# Patient Record
Sex: FEMALE | Race: WHITE | Employment: FULL TIME | URBAN - METROPOLITAN AREA
[De-identification: names, ages, dates, MRNs, and addresses within clinical notes are randomized per-mention and may not be internally consistent; named-entity substitution may affect disease eponyms.]

---

## 2017-01-18 ENCOUNTER — TELEPHONE (OUTPATIENT)
Dept: ENT CLINIC | Age: 27
End: 2017-01-18

## 2017-01-18 DIAGNOSIS — J01.91 ACUTE RECURRENT SINUSITIS, UNSPECIFIED LOCATION: Primary | ICD-10-CM

## 2017-01-18 RX ORDER — AZITHROMYCIN 250 MG/1
TABLET, FILM COATED ORAL
Qty: 1 PACKET | Refills: 0 | Status: SHIPPED | OUTPATIENT
Start: 2017-01-18 | End: 2017-03-13

## 2020-03-09 ENCOUNTER — HOSPITAL ENCOUNTER (EMERGENCY)
Age: 30
Discharge: HOME | End: 2020-03-09
Payer: COMMERCIAL

## 2020-03-09 VITALS
DIASTOLIC BLOOD PRESSURE: 90 MMHG | WEIGHT: 122 LBS | BODY MASS INDEX: 25.61 KG/M2 | TEMPERATURE: 97.9 F | HEART RATE: 78 BPM | RESPIRATION RATE: 17 BRPM | SYSTOLIC BLOOD PRESSURE: 136 MMHG | OXYGEN SATURATION: 97 % | HEIGHT: 58 IN

## 2020-03-09 LAB
A/G RATIO: 1.5 (ref 1.1–2.2)
ALBUMIN SERPL-MCNC: 4.9 G/DL (ref 3.4–5)
ALP BLD-CCNC: 68 U/L (ref 40–129)
ALT SERPL-CCNC: 13 U/L (ref 10–40)
ANION GAP SERPL CALCULATED.3IONS-SCNC: 13 MMOL/L (ref 3–16)
AST SERPL-CCNC: 18 U/L (ref 15–37)
BASOPHILS ABSOLUTE: 0 K/UL (ref 0–0.2)
BASOPHILS RELATIVE PERCENT: 0.5 %
BILIRUB SERPL-MCNC: 0.5 MG/DL (ref 0–1)
BILIRUBIN URINE: NEGATIVE
BLOOD, URINE: NEGATIVE
BUN BLDV-MCNC: 17 MG/DL (ref 7–20)
CALCIUM SERPL-MCNC: 9.8 MG/DL (ref 8.3–10.6)
CHLORIDE BLD-SCNC: 98 MMOL/L (ref 99–110)
CLARITY: CLEAR
CO2: 24 MMOL/L (ref 21–32)
COLOR: YELLOW
CREAT SERPL-MCNC: 0.6 MG/DL (ref 0.6–1.1)
EKG ATRIAL RATE: 63 BPM
EKG DIAGNOSIS: NORMAL
EKG P AXIS: 44 DEGREES
EKG P-R INTERVAL: 194 MS
EKG Q-T INTERVAL: 374 MS
EKG QRS DURATION: 76 MS
EKG QTC CALCULATION (BAZETT): 382 MS
EKG R AXIS: 11 DEGREES
EKG T AXIS: 21 DEGREES
EKG VENTRICULAR RATE: 63 BPM
EOSINOPHILS ABSOLUTE: 0.1 K/UL (ref 0–0.6)
EOSINOPHILS RELATIVE PERCENT: 0.7 %
GFR AFRICAN AMERICAN: >60
GFR NON-AFRICAN AMERICAN: >60
GLOBULIN: 3.2 G/DL
GLUCOSE BLD-MCNC: 93 MG/DL (ref 70–99)
GLUCOSE URINE: NEGATIVE MG/DL
HCG QUALITATIVE: NEGATIVE
HCT VFR BLD CALC: 40 % (ref 36–48)
HEMOGLOBIN: 13.5 G/DL (ref 12–16)
KETONES, URINE: NEGATIVE MG/DL
LEUKOCYTE ESTERASE, URINE: NEGATIVE
LYMPHOCYTES ABSOLUTE: 2.4 K/UL (ref 1–5.1)
LYMPHOCYTES RELATIVE PERCENT: 31.6 %
MCH RBC QN AUTO: 30.2 PG (ref 26–34)
MCHC RBC AUTO-ENTMCNC: 33.8 G/DL (ref 31–36)
MCV RBC AUTO: 89.3 FL (ref 80–100)
MICROSCOPIC EXAMINATION: NORMAL
MONOCYTES ABSOLUTE: 0.5 K/UL (ref 0–1.3)
MONOCYTES RELATIVE PERCENT: 6.1 %
NEUTROPHILS ABSOLUTE: 4.7 K/UL (ref 1.7–7.7)
NEUTROPHILS RELATIVE PERCENT: 61.1 %
NITRITE, URINE: NEGATIVE
PDW BLD-RTO: 13.1 % (ref 12.4–15.4)
PH UA: 5.5 (ref 5–8)
PLATELET # BLD: 329 K/UL (ref 135–450)
PMV BLD AUTO: 8 FL (ref 5–10.5)
POTASSIUM SERPL-SCNC: 4.1 MMOL/L (ref 3.5–5.1)
PROTEIN UA: NEGATIVE MG/DL
RBC # BLD: 4.47 M/UL (ref 4–5.2)
SODIUM BLD-SCNC: 135 MMOL/L (ref 136–145)
SPECIFIC GRAVITY UA: 1.02 (ref 1–1.03)
TOTAL PROTEIN: 8.1 G/DL (ref 6.4–8.2)
URINE TYPE: NORMAL
UROBILINOGEN, URINE: 0.2 E.U./DL
WBC # BLD: 7.6 K/UL (ref 4–11)

## 2020-03-09 PROCEDURE — 93005 ELECTROCARDIOGRAM TRACING: CPT | Performed by: EMERGENCY MEDICINE

## 2020-03-09 PROCEDURE — 93010 ELECTROCARDIOGRAM REPORT: CPT | Performed by: INTERNAL MEDICINE

## 2020-03-09 PROCEDURE — 80053 COMPREHEN METABOLIC PANEL: CPT

## 2020-03-09 PROCEDURE — 84703 CHORIONIC GONADOTROPIN ASSAY: CPT

## 2020-03-09 PROCEDURE — 81003 URINALYSIS AUTO W/O SCOPE: CPT

## 2020-03-09 PROCEDURE — 85025 COMPLETE CBC W/AUTO DIFF WBC: CPT

## 2020-03-09 PROCEDURE — 6370000000 HC RX 637 (ALT 250 FOR IP): Performed by: NURSE PRACTITIONER

## 2020-03-09 PROCEDURE — 99284 EMERGENCY DEPT VISIT MOD MDM: CPT

## 2020-03-09 RX ORDER — MECLIZINE HCL 12.5 MG/1
12.5 TABLET ORAL 3 TIMES DAILY PRN
Qty: 15 TABLET | Refills: 0 | Status: SHIPPED | OUTPATIENT
Start: 2020-03-09 | End: 2020-03-19

## 2020-03-09 RX ORDER — PSEUDOEPHEDRINE HCL 120 MG/1
120 TABLET, FILM COATED, EXTENDED RELEASE ORAL ONCE
Status: COMPLETED | OUTPATIENT
Start: 2020-03-09 | End: 2020-03-09

## 2020-03-09 RX ORDER — MECLIZINE HCL 12.5 MG/1
25 TABLET ORAL ONCE
Status: COMPLETED | OUTPATIENT
Start: 2020-03-09 | End: 2020-03-09

## 2020-03-09 RX ADMIN — PSEUDOEPHEDRINE HCL 120 MG: 120 TABLET, FILM COATED, EXTENDED RELEASE ORAL at 11:17

## 2020-03-09 RX ADMIN — MECLIZINE 25 MG: 12.5 TABLET ORAL at 11:17

## 2020-03-09 ASSESSMENT — PAIN DESCRIPTION - PAIN TYPE: TYPE: ACUTE PAIN

## 2020-03-09 ASSESSMENT — PAIN DESCRIPTION - LOCATION: LOCATION: FACE

## 2020-03-09 ASSESSMENT — PAIN DESCRIPTION - DESCRIPTORS: DESCRIPTORS: PRESSURE

## 2020-03-09 ASSESSMENT — PAIN SCALES - GENERAL
PAINLEVEL_OUTOF10: 2
PAINLEVEL_OUTOF10: 1

## 2020-03-09 NOTE — LETTER
[facility]  ED  [address] [address]  Phone: [phone]  Fax: [phone]             March 9, 2020    Patient: Kennedy Momin   YOB: 1990   Date of Visit: 3/9/2020       To Whom It May Concern:    Rylie Mckeon was seen and treated in our emergency department on 3/9/2020. She may return to work on 3/10/2020.       Sincerely,             Signature:__________________________________

## 2020-03-09 NOTE — ED PROVIDER NOTES
[facility]  ED  EMERGENCY DEPARTMENT ENCOUNTER      Pt Name: Taj Portillo  MRN: [id number]  [place]fblair 1990  Date of evaluation: 3/9/2020  Provider: FANG Holcomb CNP  PCP: No primary care provider on file. This patient was not seen and evaluated by the attending physician Dr. Leydi Gagnon. CHIEF COMPLAINT  Chief Complaint   Patient presents with    Dizziness     patient reporting waking up with a nose bleed this morning states when she got out of bed she felt dizzy and is reporting facial pain/pressure    Facial Pain       HISTORY OF PRESENT ILLNESS  Taj Client is a 30 y.o. female presents to the emergency room by private vehicle for evaluation of facial pressure 2 out of 10 with intermittent headache that resolves with decreased pressure. Upon waking this morning she had a brief nosebleed from her left nostril and episode of dizziness and \"fogginess \"which was relieved by orange juice. She states she has had hypoglycemic episodes in the past.  She is also had migraines and says this is definitely not the worst headache of her life is very mild and intermittent. Treatment prior to arrival includes ibuprofen with improvement. She does have intermittent dizziness with walking never passed out. Denies fever, chills, diaphoresis, syncope, worst headache of life, sudden onset, aura, light/sound sensitivity, weakness, changes in sensation, chest pain, difficulty breathing, shortness of breath, wheeze, difficulty handling secretions, nausea, vomiting, diarrhea, abdominal pain, recent head injury, remote surgery, and recent illness. No other complaints, modifying factors or associated symptoms. Nursing notes reviewed. Past Medical History:   Diagnosis Date    IBS (irritable bowel syndrome)     Small bowel problem      Past Surgical History:   Procedure Laterality Date    CHOLECYSTECTOMY       History reviewed. No pertinent family history.   Social History Occupational History    Not on file   Tobacco Use    Smoking status: Former Smoker     Packs/day: 0.50     Types: Cigarettes    Smokeless tobacco: Never Used   Substance and Sexual Activity    Alcohol use: No     Comment: occasionally    Drug use: No    Sexual activity: Yes     No current facility-administered medications on file prior to encounter. Current Outpatient Medications on File Prior to Encounter   Medication Sig Dispense Refill    ranitidine (ZANTAC) 300 MG tablet Take 1 tablet by mouth nightly for 15 days. 15 tablet 0     Allergies   Allergen Reactions    Codeine Swelling    Vicodin [Hydrocodone-Acetaminophen] Nausea And Vomiting     Pt. States her throat swelled when she took it. REVIEW OF SYSTEMS  10 systems reviewed, pertinent positives per HPI otherwise noted to be negative    PHYSICAL EXAM  BP (!) 136/90   Pulse 78   Temp 97.9 °F (36.6 °C) (Oral)   Resp 17   Ht 4' 10\" (1.473 m)   Wt 122 lb (55.3 kg)   LMP 03/02/2020   SpO2 97%   BMI 25.50 kg/m²     Physical Exam  Vitals signs and nursing note reviewed. Constitutional:       General: She is not in acute distress. Appearance: She is well-developed. She is not toxic-appearing or diaphoretic. HENT:      Head: Normocephalic and atraumatic. Eyes:      General:         Right eye: No discharge. Left eye: No discharge. Conjunctiva/sclera: Conjunctivae normal.   Neck:      Musculoskeletal: Normal range of motion and neck supple. Cardiovascular:      Rate and Rhythm: Normal rate and regular rhythm. Heart sounds: Normal heart sounds. No murmur. Pulmonary:      Effort: Pulmonary effort is normal. No respiratory distress. Breath sounds: Normal breath sounds. No wheezing. Abdominal:      General: Bowel sounds are normal.      Palpations: Abdomen is soft. Skin:     General: Skin is warm and dry. Findings: No rash.    Neurological:      Mental Status: She is alert and oriented to person, Non-plain film images such as CT, Ultrasound and MRI are read by the radiologist. Plain radiographic images are visualized and preliminarily interpreted by the  ED Provider with the below findings:    Interpretation per the Radiologistbelow, if available at the time of this note:    No orders to display     No results found. ED COURSE/ MDM    Patient was given the following medications:  Medications   pseudoephedrine (SUDAFED 12 HR) extended release tablet 120 mg (120 mg Oral Given 3/9/20 1117)   meclizine (ANTIVERT) tablet 25 mg (25 mg Oral Given 3/9/20 1117)            Patient seen and evaluated. After initial evaluation, differential diagnostic considerations included: benign positional vertigo, labyrinthitis/otitis, sepsis, dehydration/orthostasis, vasovagal reaction, stroke, TIA, intracranial bleed, migraine, anxiety, ACS/dysrhythmia, medication side effects, head trauma    12 lead EKG ordered and performed. EKG interpreted by the attending physician as non acute. Please see attending's note for full interpretation. IV established labs, urinalysis, and imaging ordered    25-year-old female presents emergency room with dizziness and mild intermittent face pressure treated symptomatic with above medications with resolution of symptoms. Patient's labs diagnostically benign with negative pregnancy. Discussed returning for worsening symptoms, syncope, worse headache of life, abruptly worsening headache, persistent dizziness. We did discuss that full headache examination was not performed including CT and spinal tap she is agreeable to current plan and returning if symptoms worsen. Pre-hypertension/Hypertension: The patient has been informed that they may have pre-hypertension or Hypertension based on a blood pressure reading in the emergency department.  I recommend that the patient call the primary care provider listed on their discharge instructions or a physician of their choice this week to arrange follow up for further evaluation of possible pre-hypertension or Hypertension. Afebrile, non toxic inappearance, in no acute distress, pulse ox is 97% on room air and is not hypoxic. Will be discharged to home in stable condition with outpatient follow up. Instructed patient and/or family to return to the emergency room for new or worsening symptoms and any concerns. Instructed to call referrals below to discuss ER visit and follow-up plan. MDM  Considered peripheral source (BPPV, labyrinthitis, menieres, neuritis, acoustic neuroma, ototoxic meds, otitis media/externa) central source (hemorrhage, vertebral basilar insufficiency, cerebellar trauma, neoplasm, basilar migraine, MS, medication reaction) hypovolemia, anemia, MI, cardiac or valvular disease, dysrhythmia, PE, hypoglycemia, hypoxia, hypercarbia, vasovagal episode, psychogenic. Currently no evidence of trauma, hypoglycemia, electrolyte disturbance, toxicologic source. ECG with no evidence of arrhythmia. Will DC at this time with close follow up with PCP for recheck in 24-48 hours. The patient tolerated their visit well. The patient and / or the family were informed of the results of any tests, a time was given to answer questions, a plan was proposed and they agreed with plan. Patient was given scripts for the following medications. I counseled patient how to take these medications. Discharge Medication List as of 3/9/2020 12:06 PM      START taking these medications    Details   meclizine (ANTIVERT) 12.5 MG tablet Take 1 tablet by mouth 3 times daily as needed for Dizziness, Disp-15 tablet, R-0Print             CLINICAL IMPRESSION  1. Dizziness    2. Sinus headache        Blood pressure (!) 136/90, pulse 78, temperature 97.9 °F (36.6 °C), temperature source Oral, resp. rate 17, height 4' 10\" (1.473 m), weight 122 lb (55.3 kg), last menstrual period 03/02/2020, SpO2 97 %.     DISPOSITION  DISPOSITION Decision To Discharge 03/09/2020

## 2023-02-13 ENCOUNTER — APPOINTMENT (OUTPATIENT)
Dept: GENERAL RADIOLOGY | Age: 33
End: 2023-02-13
Payer: COMMERCIAL

## 2023-02-13 ENCOUNTER — APPOINTMENT (OUTPATIENT)
Dept: CT IMAGING | Age: 33
End: 2023-02-13
Payer: COMMERCIAL

## 2023-02-13 ENCOUNTER — HOSPITAL ENCOUNTER (EMERGENCY)
Age: 33
Discharge: HOME | End: 2023-02-13
Attending: STUDENT IN AN ORGANIZED HEALTH CARE EDUCATION/TRAINING PROGRAM
Payer: COMMERCIAL

## 2023-02-13 VITALS
BODY MASS INDEX: 26.45 KG/M2 | HEIGHT: 58 IN | HEART RATE: 100 BPM | RESPIRATION RATE: 18 BRPM | TEMPERATURE: 98 F | OXYGEN SATURATION: 100 % | WEIGHT: 126 LBS | SYSTOLIC BLOOD PRESSURE: 117 MMHG | DIASTOLIC BLOOD PRESSURE: 75 MMHG

## 2023-02-13 DIAGNOSIS — K35.20 ACUTE APPENDICITIS WITH GENERALIZED PERITONITIS, WITHOUT GANGRENE OR ABSCESS, UNSPECIFIED WHETHER PERFORATION PRESENT: Primary | ICD-10-CM

## 2023-02-13 LAB
A/G RATIO: 1.9 (ref 1.1–2.2)
ALBUMIN SERPL-MCNC: 5.1 G/DL (ref 3.4–5)
ALP BLD-CCNC: 75 U/L (ref 40–129)
ALT SERPL-CCNC: 19 U/L (ref 10–40)
ANION GAP SERPL CALCULATED.3IONS-SCNC: 12 MMOL/L (ref 3–16)
AST SERPL-CCNC: 19 U/L (ref 15–37)
BASOPHILS ABSOLUTE: 0.1 K/UL (ref 0–0.2)
BASOPHILS RELATIVE PERCENT: 0.5 %
BILIRUB SERPL-MCNC: 0.5 MG/DL (ref 0–1)
BILIRUBIN URINE: NEGATIVE
BLOOD, URINE: NEGATIVE
BUN BLDV-MCNC: 13 MG/DL (ref 7–20)
CALCIUM SERPL-MCNC: 10 MG/DL (ref 8.3–10.6)
CHLORIDE BLD-SCNC: 102 MMOL/L (ref 99–110)
CLARITY: CLEAR
CO2: 24 MMOL/L (ref 21–32)
COLOR: YELLOW
CREAT SERPL-MCNC: 0.7 MG/DL (ref 0.6–1.1)
EOSINOPHILS ABSOLUTE: 0 K/UL (ref 0–0.6)
EOSINOPHILS RELATIVE PERCENT: 0 %
GFR SERPL CREATININE-BSD FRML MDRD: >60 ML/MIN/{1.73_M2}
GLUCOSE BLD-MCNC: 138 MG/DL (ref 70–99)
GLUCOSE URINE: NEGATIVE MG/DL
GONADOTROPIN, CHORIONIC (HCG) QUANT: <5 MIU/ML
HCT VFR BLD CALC: 38.9 % (ref 36–48)
HEMOGLOBIN: 13.2 G/DL (ref 12–16)
KETONES, URINE: NEGATIVE MG/DL
LACTIC ACID, SEPSIS: 1.5 MMOL/L (ref 0.4–1.9)
LEUKOCYTE ESTERASE, URINE: NEGATIVE
LIPASE: 60 U/L (ref 13–60)
LYMPHOCYTES ABSOLUTE: 0.9 K/UL (ref 1–5.1)
LYMPHOCYTES RELATIVE PERCENT: 4 %
MCH RBC QN AUTO: 30.2 PG (ref 26–34)
MCHC RBC AUTO-ENTMCNC: 33.8 G/DL (ref 31–36)
MCV RBC AUTO: 89.3 FL (ref 80–100)
MICROSCOPIC EXAMINATION: ABNORMAL
MONOCYTES ABSOLUTE: 0.7 K/UL (ref 0–1.3)
MONOCYTES RELATIVE PERCENT: 3.3 %
NEUTROPHILS ABSOLUTE: 20.7 K/UL (ref 1.7–7.7)
NEUTROPHILS RELATIVE PERCENT: 92.2 %
NITRITE, URINE: NEGATIVE
PDW BLD-RTO: 12.9 % (ref 12.4–15.4)
PH UA: 5.5 (ref 5–8)
PLATELET # BLD: 302 K/UL (ref 135–450)
PMV BLD AUTO: 7.5 FL (ref 5–10.5)
POTASSIUM SERPL-SCNC: 4 MMOL/L (ref 3.5–5.1)
PROCALCITONIN: 0.03 NG/ML (ref 0–0.15)
PROTEIN UA: NEGATIVE MG/DL
RBC # BLD: 4.36 M/UL (ref 4–5.2)
SODIUM BLD-SCNC: 138 MMOL/L (ref 136–145)
SPECIFIC GRAVITY UA: 1.02 (ref 1–1.03)
TOTAL PROTEIN: 7.8 G/DL (ref 6.4–8.2)
URINE REFLEX TO CULTURE: ABNORMAL
URINE TYPE: ABNORMAL
UROBILINOGEN, URINE: 2 E.U./DL
WBC # BLD: 22.4 K/UL (ref 4–11)

## 2023-02-13 PROCEDURE — 74177 CT ABD & PELVIS W/CONTRAST: CPT

## 2023-02-13 PROCEDURE — 2580000003 HC RX 258: Performed by: NURSE PRACTITIONER

## 2023-02-13 PROCEDURE — 81003 URINALYSIS AUTO W/O SCOPE: CPT

## 2023-02-13 PROCEDURE — 87040 BLOOD CULTURE FOR BACTERIA: CPT

## 2023-02-13 PROCEDURE — 84702 CHORIONIC GONADOTROPIN TEST: CPT

## 2023-02-13 PROCEDURE — 96375 TX/PRO/DX INJ NEW DRUG ADDON: CPT

## 2023-02-13 PROCEDURE — 6360000002 HC RX W HCPCS: Performed by: NURSE PRACTITIONER

## 2023-02-13 PROCEDURE — 80053 COMPREHEN METABOLIC PANEL: CPT

## 2023-02-13 PROCEDURE — 6360000002 HC RX W HCPCS: Performed by: STUDENT IN AN ORGANIZED HEALTH CARE EDUCATION/TRAINING PROGRAM

## 2023-02-13 PROCEDURE — 74018 RADEX ABDOMEN 1 VIEW: CPT

## 2023-02-13 PROCEDURE — 99285 EMERGENCY DEPT VISIT HI MDM: CPT

## 2023-02-13 PROCEDURE — 83690 ASSAY OF LIPASE: CPT

## 2023-02-13 PROCEDURE — 6360000004 HC RX CONTRAST MEDICATION: Performed by: NURSE PRACTITIONER

## 2023-02-13 PROCEDURE — 96365 THER/PROPH/DIAG IV INF INIT: CPT

## 2023-02-13 PROCEDURE — 83605 ASSAY OF LACTIC ACID: CPT

## 2023-02-13 PROCEDURE — 84145 PROCALCITONIN (PCT): CPT

## 2023-02-13 PROCEDURE — 85025 COMPLETE CBC W/AUTO DIFF WBC: CPT

## 2023-02-13 RX ORDER — 0.9 % SODIUM CHLORIDE 0.9 %
1000 INTRAVENOUS SOLUTION INTRAVENOUS ONCE
Status: COMPLETED | OUTPATIENT
Start: 2023-02-13 | End: 2023-02-13

## 2023-02-13 RX ORDER — ONDANSETRON 2 MG/ML
4 INJECTION INTRAMUSCULAR; INTRAVENOUS ONCE
Status: COMPLETED | OUTPATIENT
Start: 2023-02-13 | End: 2023-02-13

## 2023-02-13 RX ORDER — MORPHINE SULFATE 4 MG/ML
4 INJECTION, SOLUTION INTRAMUSCULAR; INTRAVENOUS ONCE
Status: DISCONTINUED | OUTPATIENT
Start: 2023-02-13 | End: 2023-02-14 | Stop reason: HOSPADM

## 2023-02-13 RX ORDER — KETOROLAC TROMETHAMINE 30 MG/ML
15 INJECTION, SOLUTION INTRAMUSCULAR; INTRAVENOUS ONCE
Status: COMPLETED | OUTPATIENT
Start: 2023-02-13 | End: 2023-02-13

## 2023-02-13 RX ADMIN — ONDANSETRON 4 MG: 2 INJECTION INTRAMUSCULAR; INTRAVENOUS at 18:19

## 2023-02-13 RX ADMIN — KETOROLAC TROMETHAMINE 15 MG: 30 INJECTION, SOLUTION INTRAMUSCULAR at 21:26

## 2023-02-13 RX ADMIN — SODIUM CHLORIDE 1000 ML: 9 INJECTION, SOLUTION INTRAVENOUS at 18:18

## 2023-02-13 RX ADMIN — PIPERACILLIN AND TAZOBACTAM 3375 MG: 3; .375 INJECTION, POWDER, LYOPHILIZED, FOR SOLUTION INTRAVENOUS at 20:27

## 2023-02-13 RX ADMIN — IOPAMIDOL 75 ML: 755 INJECTION, SOLUTION INTRAVENOUS at 19:30

## 2023-02-13 ASSESSMENT — PAIN - FUNCTIONAL ASSESSMENT: PAIN_FUNCTIONAL_ASSESSMENT: 0-10

## 2023-02-13 ASSESSMENT — PAIN DESCRIPTION - DESCRIPTORS: DESCRIPTORS: ACHING

## 2023-02-13 ASSESSMENT — PAIN SCALES - GENERAL
PAINLEVEL_OUTOF10: 4
PAINLEVEL_OUTOF10: 4
PAINLEVEL_OUTOF10: 8

## 2023-02-13 ASSESSMENT — PAIN DESCRIPTION - LOCATION
LOCATION: ABDOMEN

## 2023-02-13 NOTE — ED PROVIDER NOTES
[facility]  ED  Emergency Department Encounter    Patient Name: Wolfgang Oh  MRN: [id number]  YOB: 1990  Date of Evaluation: 2/13/2023  Provider: No primary care provider on file. Note Started: 6:43 PM EST 2/13/23    CHIEF COMPLAINT  Abdominal Pain (Starting today at 1430, hx of gastroparesis, no improvement after bowel movement. )    SHARED SERVICE VISIT  I have seen and evaluated this patient with my supervising physician, Dr. Jocelyn Sanchez. HISTORY OF PRESENT ILLNESS  Wolfgang Oh is a 27 y.o. female who presents to the ED abdominal pain. Patient started sudden onset abdominal pain to epigastric region which radiated around to the left and ends in the right lower quadrant. Patient states pain is squeezing and waxes and wanes in severity. Patient states that she does have history of gastroparesis but does not currently see a GI or take any medication for this. Patient reports similar pain in the past and was told she was severely constipated at that time. Patient states bowel movement x2 today which were formed and soft, pain was unrelieved. She endorses some nausea no vomiting. She does report some chills but denies fever, chest pain, shortness of breath, urinary complaints, diarrhea. No urinary complaints. No sick contacts or recent travel. .    No other complaints, modifying factors or associated symptoms. Nursing notes reviewed were all reviewed and agreed with or any disagreements were addressed in the HPI. PMH:  Past Medical History:   Diagnosis Date    IBS (irritable bowel syndrome)     Small bowel problem      Surgical History:  Past Surgical History:   Procedure Laterality Date    CHOLECYSTECTOMY       Family History:  No family history on file.   Social History:  Social History     Socioeconomic History    Marital status: Single     Spouse name: Not on file    Number of children: Not on file    Years of education: Not on file    Highest education level: Not on file   Occupational History    Not on file   Tobacco Use    Smoking status: Former     Packs/day: 0.50     Types: Cigarettes    Smokeless tobacco: Never   Substance and Sexual Activity    Alcohol use: No     Comment: occasionally    Drug use: No    Sexual activity: Yes   Other Topics Concern    Not on file   Social History Narrative    Not on file     Social Determinants of Health     Financial Resource Strain: Not on file   Food Insecurity: Not on file   Transportation Needs: Not on file   Physical Activity: Not on file   Stress: Not on file   Social Connections: Not on file   Intimate Partner Violence: Not on file   Housing Stability: Not on file     Current Medications:  Current Facility-Administered Medications   Medication Dose Route Frequency Provider Last Rate Last Admin    morphine sulfate (PF) injection 4 mg  4 mg IntraVENous Once Mimi Viveros APRN - CNP         No current outpatient medications on file. Allergies: Allergies   Allergen Reactions    Codeine Swelling    Vicodin [Hydrocodone-Acetaminophen] Nausea And Vomiting     Pt. States her throat swelled when she took it. Screenings:     [place] Coma Scale  Eye Opening: Spontaneous  Best Verbal Response: Oriented  Best Motor Response: Obeys commands  [place] Coma Scale Score: 15           CIWA Assessment  BP: 128/83  Heart Rate: 99          REVIEW OF SYSTEMS  Positives and Pertinent Negatives as per HPI. PHYSICAL EXAM  /83   Pulse 99   Temp 98 °F (36.7 °C) (Oral)   Resp 22   Ht 4' 10\" (1.473 m)   Wt 126 lb (57.2 kg)   LMP 01/12/2023 (Exact Date)   SpO2 100%   BMI 26.33 kg/m²     GENERAL APPEARANCE: Awake and alert. Cooperative. No acute distress. HEAD: Normocephalic. Atraumatic. EYES:  EOM's grossly intact. ENT: Mucous membranes are pink and moist.   NECK: Supple. HEART: RRR. No murmurs, rubs, or gallops. LUNGS: CTA B. No wheezing or rhonchi noted. Respirations unlabored. Good air exchange. ABDOMEN: Soft. Non-distended. Tenderness to epigastric region as well as right lower quadrant. Negative Rovsing's. EXTREMITIES: No peripheral edema. Moves all extremities equally. All extremities neurovascularly intact. SKIN: Warm and dry. No acute rashes. NEUROLOGICAL: Alert and oriented. No gross facial drooping. Strength 5/5, sensation intact. EKG  When ordered, EKG's are interpreted by the ED Physician in the absence of a Cardiologist.  Please see their note for interpretation of EKG. LABS  Labs Reviewed   CBC WITH AUTO DIFFERENTIAL - Abnormal; Notable for the following components:       Result Value    WBC 22.4 (*)     Neutrophils Absolute 20.7 (*)     Lymphocytes Absolute 0.9 (*)     All other components within normal limits   COMPREHENSIVE METABOLIC PANEL - Abnormal; Notable for the following components:    Glucose 138 (*)     Albumin 5.1 (*)     All other components within normal limits   URINALYSIS WITH REFLEX TO CULTURE - Abnormal; Notable for the following components:    Urobilinogen, Urine 2.0 (*)     All other components within normal limits   CULTURE, BLOOD 1   CULTURE, BLOOD 2   HCG, QUANTITATIVE, PREGNANCY   LACTATE, SEPSIS   LIPASE   PROCALCITONIN     When ordered, only abnormal lab results are displayed. All other labs were within normal range or not returned as of this dictation. RADIOLOGY  Non-plain film images such as CT, U/S, and MRI are read by the radiologist.  Plain radiographic images are visualized and preliminarily interpreted by the ED Provider with the below findings:     Interpretation per the Radiologist below, if available at the time of this note:  CT ABDOMEN PELVIS W IV CONTRAST Additional Contrast? None   Final Result   Acute uncomplicated appendicitis      Nonspecific 15 mm round low-attenuation lesion in the lateral segment of the   left lobe of the liver. This should be further evaluated with a non emergent   contrast MRI of the liver.          XR ABDOMEN (KUB) (SINGLE AP VIEW)   Final Result   No acute abnormality. PROCEDURES  Unless otherwise noted below, none. ED COURSE/DDx/MDM  History obtained from:  Patient    Vitals:  Vitals:    02/13/23 1650 02/13/23 1951   BP: (!) 138/96 128/83   Pulse: 97 99   Resp: 18 22   Temp: 98 °F (36.7 °C)    TempSrc: Oral    SpO2: 100% 100%   Weight: 126 lb (57.2 kg)    Height: 4' 10\" (1.473 m)      Patient received following medications in ED:  Medications   morphine sulfate (PF) injection 4 mg (4 mg IntraVENous Not Given 2/13/23 1819)   0.9 % sodium chloride bolus (0 mLs IntraVENous Stopped 2/13/23 2024)   ondansetron (ZOFRAN) injection 4 mg (4 mg IntraVENous Given 2/13/23 1819)   iopamidol (ISOVUE-370) 76 % injection 75 mL (75 mLs IntraVENous Given 2/13/23 1930)   piperacillin-tazobactam (ZOSYN) 3,375 mg in sodium chloride 0.9 % 50 mL IVPB (mini-bag) (0 mg IntraVENous Stopped 2/13/23 2057)   ketorolac (TORADOL) injection 15 mg (15 mg IntraVENous Given 2/13/23 2126)     Sepsis Consideration:  Is this patient to be included in the SEP-1 Core Measure due to severe sepsis or septic shock? No   Exclusion criteria - the patient is NOT to be included for SEP-1 Core Measure due to: Alternative explanation for abnormal labs/vitals that do not relate to sepsis, see MDM for further explanation    Records Reviewed:   None. Chronic conditions affecting care:   IBS, gastroparesis. has a past medical history of IBS (irritable bowel syndrome) and Small bowel problem. Social Determinants:   None    Consults:   General surgery    Reassessment:      Upon reassessment patient with improved pain and declined need for IV morphine. She remains hemodynamically stable without worsening condition or significant complaint. MDM/Disposition Considerations:   Briefly Sona Vaz presents for sudden onset epigastric pain that radiates into lower left and lower right quadrant started today. Relieved by bowel movement x2. Endorses nausea no vomiting. History of gastroparesis. States previous and similar episode with findings of severe constipation. Abdomen soft, nondistended, tenderness to epigastric region and right lower quadrant on exam.  Negative Rovsing's negative psoas sign. CBC with leukocytosis of 22. Lactic normal at 1.5.  chemistry unremarkable. hCG quant < 5 patient afebrile KUB with no acute abnormality. Lactic and blood cultures ordered for elevated leukocytosis as well as CT abdomen pelvis. CT abdomen pelvis with findings of acute uncomplicated appendicitis. Consult placed to general surgery and pending. Patient given IV fluids and IV Zofran with good relief. Patient did not meet severe sepsis criteria with lactic acid, Normal vital signs: elevated leukocytosis explained by acute uncomplicated appendicitis. 20:00: I discussed the history, physical, and treatment plan with Dr. Tianna Andrade. Gallo Liu was signed out in stable condition. Please see Dr. Igor Pritchard note for further details, including diagnosis and disposition. Critical Care Time:   0 Minutes of critical care time spent not including separately billable procedures. DDx:  I estimate there is LOW risk for  BOWEL OBSTRUCTION, CHOLECYSTITIS, DIVERTICULITIS, INCARCERATED HERNIA, PANCREATITIS, or PERFORATED BOWEL or ULCER, thus I consider the discharge disposition reasonable. Also, there is no evidence or peritonitis, sepsis, or toxicity. Gallo Liu and I have also discussed returning to the Emergency Department immediately if new or worsening symptoms occur. We have discussed the symptoms which are most concerning (e.g., bloody stool, fever, changing or worsening pain, vomiting) that necessitate immediate return. FINAL IMPRESSION  1.  Acute appendicitis with generalized peritonitis, without gangrene or abscess, unspecified whether perforation present      Patient referred to:  [facility] [address] [address]  [phone]  Go to   [address] [place] [address] at [facility]. Do not eat or drink anything after midnight. Tell them you are having surgery with Dr. Adelita Solis. Risk management discussed and shared decision making had with patient and/or surrogate. All questions were answered. DISPOSITION:  Please refer to 's note for final disposition. (Please note that portions of this note were completed with a voice recognition program.  Efforts were made to edit the dictations but occasionally words are mis-transcribed).          Mimi Viveros, FANG - CNP  02/13/23 0775

## 2023-02-14 ENCOUNTER — HOSPITAL ENCOUNTER (OUTPATIENT)
Age: 33
Setting detail: OUTPATIENT SURGERY
Discharge: HOME | End: 2023-02-14
Attending: SURGERY | Admitting: SURGERY
Payer: COMMERCIAL

## 2023-02-14 ENCOUNTER — ANESTHESIA EVENT (OUTPATIENT)
Dept: OPERATING ROOM | Age: 33
End: 2023-02-14
Payer: COMMERCIAL

## 2023-02-14 ENCOUNTER — ANESTHESIA (OUTPATIENT)
Dept: OPERATING ROOM | Age: 33
End: 2023-02-14
Payer: COMMERCIAL

## 2023-02-14 VITALS
TEMPERATURE: 98 F | HEART RATE: 63 BPM | HEIGHT: 58 IN | BODY MASS INDEX: 26.45 KG/M2 | DIASTOLIC BLOOD PRESSURE: 84 MMHG | RESPIRATION RATE: 16 BRPM | OXYGEN SATURATION: 99 % | SYSTOLIC BLOOD PRESSURE: 127 MMHG | WEIGHT: 126 LBS

## 2023-02-14 DIAGNOSIS — K37 APPENDICITIS, UNSPECIFIED APPENDICITIS TYPE: ICD-10-CM

## 2023-02-14 DIAGNOSIS — K35.30 ACUTE APPENDICITIS WITH LOCALIZED PERITONITIS, WITHOUT PERFORATION, ABSCESS, OR GANGRENE: Primary | ICD-10-CM

## 2023-02-14 LAB — PREGNANCY, URINE: NEGATIVE

## 2023-02-14 PROCEDURE — 84703 CHORIONIC GONADOTROPIN ASSAY: CPT

## 2023-02-14 PROCEDURE — 2580000003 HC RX 258: Performed by: SURGERY

## 2023-02-14 PROCEDURE — 2500000003 HC RX 250 WO HCPCS: Performed by: NURSE ANESTHETIST, CERTIFIED REGISTERED

## 2023-02-14 PROCEDURE — 2580000003 HC RX 258: Performed by: NURSE ANESTHETIST, CERTIFIED REGISTERED

## 2023-02-14 PROCEDURE — 44970 LAPAROSCOPY APPENDECTOMY: CPT | Performed by: SURGERY

## 2023-02-14 PROCEDURE — 3700000000 HC ANESTHESIA ATTENDED CARE: Performed by: SURGERY

## 2023-02-14 PROCEDURE — 3600000014 HC SURGERY LEVEL 4 ADDTL 15MIN: Performed by: SURGERY

## 2023-02-14 PROCEDURE — 2709999900 HC NON-CHARGEABLE SUPPLY: Performed by: SURGERY

## 2023-02-14 PROCEDURE — 6360000002 HC RX W HCPCS: Performed by: ANESTHESIOLOGY

## 2023-02-14 PROCEDURE — 88304 TISSUE EXAM BY PATHOLOGIST: CPT

## 2023-02-14 PROCEDURE — 6360000002 HC RX W HCPCS: Performed by: SURGERY

## 2023-02-14 PROCEDURE — 2720000010 HC SURG SUPPLY STERILE: Performed by: SURGERY

## 2023-02-14 PROCEDURE — 7100000010 HC PHASE II RECOVERY - FIRST 15 MIN: Performed by: SURGERY

## 2023-02-14 PROCEDURE — 6370000000 HC RX 637 (ALT 250 FOR IP): Performed by: ANESTHESIOLOGY

## 2023-02-14 PROCEDURE — 7100000000 HC PACU RECOVERY - FIRST 15 MIN: Performed by: SURGERY

## 2023-02-14 PROCEDURE — 3600000004 HC SURGERY LEVEL 4 BASE: Performed by: SURGERY

## 2023-02-14 PROCEDURE — 2500000003 HC RX 250 WO HCPCS: Performed by: SURGERY

## 2023-02-14 PROCEDURE — 99222 1ST HOSP IP/OBS MODERATE 55: CPT | Performed by: SURGERY

## 2023-02-14 PROCEDURE — 3700000001 HC ADD 15 MINUTES (ANESTHESIA): Performed by: SURGERY

## 2023-02-14 PROCEDURE — 7100000011 HC PHASE II RECOVERY - ADDTL 15 MIN: Performed by: SURGERY

## 2023-02-14 PROCEDURE — 6360000002 HC RX W HCPCS: Performed by: NURSE ANESTHETIST, CERTIFIED REGISTERED

## 2023-02-14 PROCEDURE — 7100000001 HC PACU RECOVERY - ADDTL 15 MIN: Performed by: SURGERY

## 2023-02-14 RX ORDER — SODIUM CHLORIDE, SODIUM LACTATE, POTASSIUM CHLORIDE, CALCIUM CHLORIDE 600; 310; 30; 20 MG/100ML; MG/100ML; MG/100ML; MG/100ML
INJECTION, SOLUTION INTRAVENOUS CONTINUOUS
Status: DISCONTINUED | OUTPATIENT
Start: 2023-02-14 | End: 2023-02-14 | Stop reason: HOSPADM

## 2023-02-14 RX ORDER — SODIUM CHLORIDE, SODIUM LACTATE, POTASSIUM CHLORIDE, CALCIUM CHLORIDE 600; 310; 30; 20 MG/100ML; MG/100ML; MG/100ML; MG/100ML
INJECTION, SOLUTION INTRAVENOUS CONTINUOUS PRN
Status: DISCONTINUED | OUTPATIENT
Start: 2023-02-14 | End: 2023-02-14 | Stop reason: SDUPTHER

## 2023-02-14 RX ORDER — SODIUM CHLORIDE, SODIUM LACTATE, POTASSIUM CHLORIDE, AND CALCIUM CHLORIDE .6; .31; .03; .02 G/100ML; G/100ML; G/100ML; G/100ML
IRRIGANT IRRIGATION PRN
Status: DISCONTINUED | OUTPATIENT
Start: 2023-02-14 | End: 2023-02-14 | Stop reason: ALTCHOICE

## 2023-02-14 RX ORDER — MEPERIDINE HYDROCHLORIDE 25 MG/ML
12.5 INJECTION INTRAMUSCULAR; INTRAVENOUS; SUBCUTANEOUS EVERY 5 MIN PRN
Status: DISCONTINUED | OUTPATIENT
Start: 2023-02-14 | End: 2023-02-14 | Stop reason: HOSPADM

## 2023-02-14 RX ORDER — SODIUM CHLORIDE 0.9 % (FLUSH) 0.9 %
5-40 SYRINGE (ML) INJECTION EVERY 12 HOURS SCHEDULED
Status: DISCONTINUED | OUTPATIENT
Start: 2023-02-14 | End: 2023-02-14 | Stop reason: HOSPADM

## 2023-02-14 RX ORDER — MIDAZOLAM HYDROCHLORIDE 1 MG/ML
1 INJECTION INTRAMUSCULAR; INTRAVENOUS EVERY 5 MIN PRN
Status: DISCONTINUED | OUTPATIENT
Start: 2023-02-14 | End: 2023-02-14 | Stop reason: HOSPADM

## 2023-02-14 RX ORDER — SODIUM CHLORIDE 9 MG/ML
25 INJECTION, SOLUTION INTRAVENOUS PRN
Status: DISCONTINUED | OUTPATIENT
Start: 2023-02-14 | End: 2023-02-14 | Stop reason: HOSPADM

## 2023-02-14 RX ORDER — OXYCODONE HYDROCHLORIDE 5 MG/1
5 TABLET ORAL
Status: COMPLETED | OUTPATIENT
Start: 2023-02-14 | End: 2023-02-14

## 2023-02-14 RX ORDER — DIPHENHYDRAMINE HYDROCHLORIDE 50 MG/ML
12.5 INJECTION INTRAMUSCULAR; INTRAVENOUS
Status: DISCONTINUED | OUTPATIENT
Start: 2023-02-14 | End: 2023-02-14 | Stop reason: HOSPADM

## 2023-02-14 RX ORDER — HEPARIN SODIUM 5000 [USP'U]/ML
5000 INJECTION, SOLUTION INTRAVENOUS; SUBCUTANEOUS ONCE
Status: COMPLETED | OUTPATIENT
Start: 2023-02-14 | End: 2023-02-14

## 2023-02-14 RX ORDER — KETOROLAC TROMETHAMINE 30 MG/ML
30 INJECTION, SOLUTION INTRAMUSCULAR; INTRAVENOUS ONCE
Status: DISCONTINUED | OUTPATIENT
Start: 2023-02-14 | End: 2023-02-14 | Stop reason: HOSPADM

## 2023-02-14 RX ORDER — LIDOCAINE HYDROCHLORIDE 20 MG/ML
INJECTION, SOLUTION EPIDURAL; INFILTRATION; INTRACAUDAL; PERINEURAL PRN
Status: DISCONTINUED | OUTPATIENT
Start: 2023-02-14 | End: 2023-02-14 | Stop reason: SDUPTHER

## 2023-02-14 RX ORDER — SODIUM CHLORIDE 9 MG/ML
INJECTION, SOLUTION INTRAVENOUS PRN
Status: CANCELLED | OUTPATIENT
Start: 2023-02-14

## 2023-02-14 RX ORDER — HYDRALAZINE HYDROCHLORIDE 20 MG/ML
5 INJECTION INTRAMUSCULAR; INTRAVENOUS
Status: DISCONTINUED | OUTPATIENT
Start: 2023-02-14 | End: 2023-02-14 | Stop reason: HOSPADM

## 2023-02-14 RX ORDER — SODIUM CHLORIDE 0.9 % (FLUSH) 0.9 %
5-40 SYRINGE (ML) INJECTION PRN
Status: DISCONTINUED | OUTPATIENT
Start: 2023-02-14 | End: 2023-02-14 | Stop reason: HOSPADM

## 2023-02-14 RX ORDER — ROCURONIUM BROMIDE 10 MG/ML
INJECTION, SOLUTION INTRAVENOUS PRN
Status: DISCONTINUED | OUTPATIENT
Start: 2023-02-14 | End: 2023-02-14 | Stop reason: SDUPTHER

## 2023-02-14 RX ORDER — LIDOCAINE HYDROCHLORIDE 10 MG/ML
1 INJECTION, SOLUTION EPIDURAL; INFILTRATION; INTRACAUDAL; PERINEURAL
Status: CANCELLED | OUTPATIENT
Start: 2023-02-14 | End: 2023-02-15

## 2023-02-14 RX ORDER — ONDANSETRON 2 MG/ML
4 INJECTION INTRAMUSCULAR; INTRAVENOUS EVERY 10 MIN PRN
Status: DISCONTINUED | OUTPATIENT
Start: 2023-02-14 | End: 2023-02-14 | Stop reason: HOSPADM

## 2023-02-14 RX ORDER — LIDOCAINE HYDROCHLORIDE 10 MG/ML
1 INJECTION, SOLUTION EPIDURAL; INFILTRATION; INTRACAUDAL; PERINEURAL
Status: DISCONTINUED | OUTPATIENT
Start: 2023-02-14 | End: 2023-02-14 | Stop reason: HOSPADM

## 2023-02-14 RX ORDER — OXYCODONE HYDROCHLORIDE 5 MG/1
5 TABLET ORAL EVERY 6 HOURS PRN
Qty: 24 TABLET | Refills: 0 | Status: SHIPPED | OUTPATIENT
Start: 2023-02-14 | End: 2023-02-21

## 2023-02-14 RX ORDER — SODIUM CHLORIDE 9 MG/ML
INJECTION, SOLUTION INTRAVENOUS PRN
Status: DISCONTINUED | OUTPATIENT
Start: 2023-02-14 | End: 2023-02-14 | Stop reason: HOSPADM

## 2023-02-14 RX ORDER — FENTANYL CITRATE 50 UG/ML
INJECTION, SOLUTION INTRAMUSCULAR; INTRAVENOUS PRN
Status: DISCONTINUED | OUTPATIENT
Start: 2023-02-14 | End: 2023-02-14 | Stop reason: SDUPTHER

## 2023-02-14 RX ORDER — DEXAMETHASONE SODIUM PHOSPHATE 4 MG/ML
INJECTION, SOLUTION INTRA-ARTICULAR; INTRALESIONAL; INTRAMUSCULAR; INTRAVENOUS; SOFT TISSUE PRN
Status: DISCONTINUED | OUTPATIENT
Start: 2023-02-14 | End: 2023-02-14 | Stop reason: SDUPTHER

## 2023-02-14 RX ORDER — BUPIVACAINE HYDROCHLORIDE 5 MG/ML
INJECTION, SOLUTION EPIDURAL; INTRACAUDAL PRN
Status: DISCONTINUED | OUTPATIENT
Start: 2023-02-14 | End: 2023-02-14 | Stop reason: ALTCHOICE

## 2023-02-14 RX ORDER — SODIUM CHLORIDE 0.9 % (FLUSH) 0.9 %
5-40 SYRINGE (ML) INJECTION EVERY 12 HOURS SCHEDULED
Status: CANCELLED | OUTPATIENT
Start: 2023-02-14

## 2023-02-14 RX ORDER — SODIUM CHLORIDE 0.9 % (FLUSH) 0.9 %
5-40 SYRINGE (ML) INJECTION PRN
Status: CANCELLED | OUTPATIENT
Start: 2023-02-14

## 2023-02-14 RX ORDER — ONDANSETRON 2 MG/ML
INJECTION INTRAMUSCULAR; INTRAVENOUS PRN
Status: DISCONTINUED | OUTPATIENT
Start: 2023-02-14 | End: 2023-02-14 | Stop reason: SDUPTHER

## 2023-02-14 RX ORDER — PROPOFOL 10 MG/ML
INJECTION, EMULSION INTRAVENOUS PRN
Status: DISCONTINUED | OUTPATIENT
Start: 2023-02-14 | End: 2023-02-14 | Stop reason: SDUPTHER

## 2023-02-14 RX ORDER — SODIUM CHLORIDE, SODIUM LACTATE, POTASSIUM CHLORIDE, CALCIUM CHLORIDE 600; 310; 30; 20 MG/100ML; MG/100ML; MG/100ML; MG/100ML
INJECTION, SOLUTION INTRAVENOUS CONTINUOUS
Status: CANCELLED | OUTPATIENT
Start: 2023-02-14

## 2023-02-14 RX ADMIN — LIDOCAINE HYDROCHLORIDE 3 ML: 20 INJECTION, SOLUTION EPIDURAL; INFILTRATION; INTRACAUDAL; PERINEURAL at 08:59

## 2023-02-14 RX ADMIN — HEPARIN SODIUM 5000 UNITS: 5000 INJECTION INTRAVENOUS; SUBCUTANEOUS at 08:35

## 2023-02-14 RX ADMIN — FENTANYL CITRATE 50 MCG: 50 INJECTION, SOLUTION INTRAMUSCULAR; INTRAVENOUS at 08:55

## 2023-02-14 RX ADMIN — FENTANYL CITRATE 50 MCG: 50 INJECTION, SOLUTION INTRAMUSCULAR; INTRAVENOUS at 09:06

## 2023-02-14 RX ADMIN — DEXAMETHASONE SODIUM PHOSPHATE 8 MG: 4 INJECTION, SOLUTION INTRAMUSCULAR; INTRAVENOUS at 09:20

## 2023-02-14 RX ADMIN — MEROPENEM 1000 MG: 1 INJECTION INTRAVENOUS at 09:02

## 2023-02-14 RX ADMIN — FENTANYL CITRATE 50 MCG: 50 INJECTION, SOLUTION INTRAMUSCULAR; INTRAVENOUS at 09:17

## 2023-02-14 RX ADMIN — SUGAMMADEX 200 MG: 100 INJECTION, SOLUTION INTRAVENOUS at 09:33

## 2023-02-14 RX ADMIN — HYDROMORPHONE HYDROCHLORIDE 0.25 MG: 1 INJECTION, SOLUTION INTRAMUSCULAR; INTRAVENOUS; SUBCUTANEOUS at 10:10

## 2023-02-14 RX ADMIN — FENTANYL CITRATE 50 MCG: 50 INJECTION, SOLUTION INTRAMUSCULAR; INTRAVENOUS at 08:57

## 2023-02-14 RX ADMIN — HYDROMORPHONE HYDROCHLORIDE 0.25 MG: 1 INJECTION, SOLUTION INTRAMUSCULAR; INTRAVENOUS; SUBCUTANEOUS at 09:59

## 2023-02-14 RX ADMIN — ONDANSETRON 4 MG: 2 INJECTION INTRAMUSCULAR; INTRAVENOUS at 09:47

## 2023-02-14 RX ADMIN — PROPOFOL 200 MG: 10 INJECTION, EMULSION INTRAVENOUS at 08:59

## 2023-02-14 RX ADMIN — MEPERIDINE HYDROCHLORIDE 12.5 MG: 25 INJECTION, SOLUTION INTRAMUSCULAR; INTRAVENOUS; SUBCUTANEOUS at 09:45

## 2023-02-14 RX ADMIN — OXYCODONE HYDROCHLORIDE 5 MG: 5 TABLET ORAL at 10:21

## 2023-02-14 RX ADMIN — ONDANSETRON HYDROCHLORIDE 4 MG: 2 INJECTION, SOLUTION INTRAMUSCULAR; INTRAVENOUS at 09:20

## 2023-02-14 RX ADMIN — ROCURONIUM BROMIDE 50 MG: 10 INJECTION, SOLUTION INTRAVENOUS at 08:59

## 2023-02-14 RX ADMIN — MEPERIDINE HYDROCHLORIDE 12.5 MG: 25 INJECTION, SOLUTION INTRAMUSCULAR; INTRAVENOUS; SUBCUTANEOUS at 10:02

## 2023-02-14 RX ADMIN — SODIUM CHLORIDE, POTASSIUM CHLORIDE, SODIUM LACTATE AND CALCIUM CHLORIDE: 600; 310; 30; 20 INJECTION, SOLUTION INTRAVENOUS at 08:45

## 2023-02-14 RX ADMIN — FENTANYL CITRATE 50 MCG: 50 INJECTION, SOLUTION INTRAMUSCULAR; INTRAVENOUS at 09:13

## 2023-02-14 ASSESSMENT — PAIN DESCRIPTION - ORIENTATION
ORIENTATION: UPPER;LEFT
ORIENTATION: UPPER;LEFT

## 2023-02-14 ASSESSMENT — PAIN - FUNCTIONAL ASSESSMENT
PAIN_FUNCTIONAL_ASSESSMENT: 0-10
PAIN_FUNCTIONAL_ASSESSMENT: ACTIVITIES ARE NOT PREVENTED
PAIN_FUNCTIONAL_ASSESSMENT: PREVENTS OR INTERFERES SOME ACTIVE ACTIVITIES AND ADLS
PAIN_FUNCTIONAL_ASSESSMENT: ACTIVITIES ARE NOT PREVENTED

## 2023-02-14 ASSESSMENT — PAIN DESCRIPTION - DESCRIPTORS
DESCRIPTORS: DULL;DISCOMFORT;PRESSURE
DESCRIPTORS: ACHING

## 2023-02-14 ASSESSMENT — PAIN DESCRIPTION - LOCATION
LOCATION: ABDOMEN
LOCATION: ABDOMEN

## 2023-02-14 ASSESSMENT — PAIN SCALES - GENERAL
PAINLEVEL_OUTOF10: 5
PAINLEVEL_OUTOF10: 6
PAINLEVEL_OUTOF10: 3

## 2023-02-14 NOTE — ANESTHESIA POSTPROCEDURE EVALUATION
Department of Anesthesiology  Postprocedure Note    Patient: Sharan Dillard  MRN: [id number]  YOB: 1990  Date of evaluation: 2/14/2023      Procedure Summary     Date: 02/14/23 Room / Location: [facility]    Anesthesia Start: 7639 Anesthesia Stop: 2610    Procedure: LAPAROSCOPIC APPENDECTOMY, (Abdomen) Diagnosis:       Appendicitis, unspecified appendicitis type      (Appendicitis, unspecified appendicitis type Erick Wilde)    Surgeons: Heath Francis MD Responsible Provider: Michelle Trivedi MD    Anesthesia Type: general ASA Status: 2          Anesthesia Type: No value filed.     Any Phase I: Any Score: 10    Any Phase II: Any Score: 10      Anesthesia Post Evaluation    Patient location during evaluation: PACU  Level of consciousness: awake  Airway patency: patent  Nausea & Vomiting: no nausea  Complications: no  Cardiovascular status: blood pressure returned to baseline  Respiratory status: acceptable  Hydration status: euvolemic

## 2023-02-14 NOTE — PROGRESS NOTES
Called and spoke to pt  Frank Bowen. Discharge instructions explained to Frank Bowen over the phone and to pt at bedside. Pt and Obinna received copy of discharge instructions and made aware to  prescriptions x 1 at [facility] outpatient pharmacy. Pt  and  deny having any questions about discharge.

## 2023-02-14 NOTE — PROGRESS NOTES
IV x 1 removed per discharge hemostasis achieved, dressing applied, no complications noted. Patient denies further needs. Pt transported to private car via wheel chair. Vitals per doc flow, pt  Lilian Harley assumes responsibility.

## 2023-02-14 NOTE — ANESTHESIA PRE PROCEDURE
Department of Anesthesiology  Preprocedure Note       Name:  Sharan Dilalrd   Age:  27 y.o.  :  1990                                          MRN:  [id number]         Date:  2023      Surgeon: Nessa Sykes):  Heath Francis MD    Procedure: Procedure(s):  LAPAROSCOPIC APPENDECTOMY, POSSIBLE OPEN PROCEDURE    Medications prior to admission:   Prior to Admission medications    Not on File       Current medications:    Current Facility-Administered Medications   Medication Dose Route Frequency Provider Last Rate Last Admin    heparin (porcine) injection 5,000 Units  5,000 Units SubCUTAneous Once Heath Francis MD        meropenem [facility]) 1,000 mg in sodium chloride 0.9 % 100 mL IVPB (mini-bag)  1,000 mg IntraVENous Once Heath Francis MD           Allergies: Allergies   Allergen Reactions    Codeine Swelling    Vicodin [Hydrocodone-Acetaminophen] Nausea And Vomiting     Pt. States her throat swelled when she took it. Problem List:    Patient Active Problem List   Diagnosis Code    Acute recurrent sinusitis J01.91       Past Medical History:        Diagnosis Date    IBS (irritable bowel syndrome)     Small bowel problem        Past Surgical History:        Procedure Laterality Date    CHOLECYSTECTOMY         Social History:    Social History     Tobacco Use    Smoking status: Former     Packs/day: 0.50     Types: Cigarettes    Smokeless tobacco: Never   Substance Use Topics    Alcohol use: No     Comment: occasionally                                Counseling given: Not Answered      Vital Signs (Current): There were no vitals filed for this visit.                                            BP Readings from Last 3 Encounters:   23 117/75   20 (!) 136/90   17 (!) 147/109       NPO Status:                                                                                 BMI:   Wt Readings from Last 3 Encounters:   23 126 lb (57.2 kg)   20 122 lb (55.3 kg)   03/13/17 110 lb (49.9 kg)     There is no height or weight on file to calculate BMI.    CBC:   Lab Results   Component Value Date/Time    WBC 22.4 02/13/2023 05:30 PM    RBC 4.36 02/13/2023 05:30 PM    HGB 13.2 02/13/2023 05:30 PM    HCT 38.9 02/13/2023 05:30 PM    MCV 89.3 02/13/2023 05:30 PM    RDW 12.9 02/13/2023 05:30 PM     02/13/2023 05:30 PM       CMP:   Lab Results   Component Value Date/Time     02/13/2023 05:30 PM    K 4.0 02/13/2023 05:30 PM     02/13/2023 05:30 PM    CO2 24 02/13/2023 05:30 PM    BUN 13 02/13/2023 05:30 PM    CREATININE 0.7 02/13/2023 05:30 PM    GFRAA >60 03/09/2020 09:27 AM    GFRAA >60 06/13/2012 04:08 AM    AGRATIO 1.9 02/13/2023 05:30 PM    LABGLOM >60 02/13/2023 05:30 PM    GLUCOSE 138 02/13/2023 05:30 PM    PROT 7.8 02/13/2023 05:30 PM    PROT 7.4 06/13/2012 04:08 AM    CALCIUM 10.0 02/13/2023 05:30 PM    BILITOT 0.5 02/13/2023 05:30 PM    ALKPHOS 75 02/13/2023 05:30 PM    AST 19 02/13/2023 05:30 PM    ALT 19 02/13/2023 05:30 PM       POC Tests: No results for input(s): POCGLU, POCNA, POCK, POCCL, POCBUN, POCHEMO, POCHCT in the last 72 hours.     Coags:   Lab Results   Component Value Date/Time    PROTIME 12.3 05/04/2012 12:05 PM    INR 1.08 05/04/2012 12:05 PM       HCG (If Applicable):   Lab Results   Component Value Date    PREGTESTUR Negative 03/24/2014        ABGs: No results found for: PHART, PO2ART, DIR5WLW, VCJ5LBQ, BEART, D7JFEDMZ     Type & Screen (If Applicable):  No results found for: LABABO, LABRH    Drug/Infectious Status (If Applicable):  Lab Results   Component Value Date/Time    HEPCAB Non-Reactive (Negative) 05/04/2012 12:05 PM       COVID-19 Screening (If Applicable): No results found for: COVID19        Anesthesia Evaluation  Patient summary reviewed and Nursing notes reviewed no history of anesthetic complications:   Airway: Mallampati: II  TM distance: >3 FB   Neck ROM: full  Mouth opening: > = 3 FB   Dental: normal exam   Pulmonary:Negative Pulmonary ROS                              Cardiovascular:Negative CV ROS                      Neuro/Psych:   Negative Neuro/Psych ROS              GI/Hepatic/Renal: Neg GI/Hepatic/Renal ROS       (-) GERD, liver disease and no renal disease       Endo/Other: Negative Endo/Other ROS       (-) diabetes mellitus               Abdominal:             Vascular: negative vascular ROS.         Other Findings:           Anesthesia Plan      general     ASA 2     (I discussed with the patient the risks and benefits of PIV, general anesthesia, IV Narcotics, PACU. All questions were answered the patient agrees with the plan)  Induction: intravenous and rapid sequence.    MIPS: Prophylactic antiemetics administered.  Anesthetic plan and risks discussed with patient.      Plan discussed with CRNA.                    JOHNNY REIS MD   2/14/2023

## 2023-02-14 NOTE — CONSULTS
Placed call to General Surgery @ 2014  RE: acute uncomplicated appendicitis per Chino Vazquez MD called back @ 2047

## 2023-02-14 NOTE — H&P
Department of General Surgery - Adult  Surgical Service   Attending History and Physical        CHIEF COMPLAINT:  Lower abdominal pain      History Obtained From:  patient    HISTORY OF PRESENT ILLNESS:    This patient is a 27 y.o. female who presents with severe, sharp lower abdominal pain that started yesterday. No alleviating or modifying factors at home. Pain radiated to and has localized to the RLQ. She had associated nausea. Past Medical History:        Diagnosis Date    IBS (irritable bowel syndrome)     Small bowel problem      Past Surgical History:        Procedure Laterality Date    CHOLECYSTECTOMY       Current Medications:  Current Facility-Administered Medications   Medication Dose Route Frequency Provider Last Rate Last Admin    heparin (porcine) injection 5,000 Units  5,000 Units SubCUTAneous Once Julia Priest MD        meropenem [facility]) 1,000 mg in sodium chloride 0.9 % 100 mL IVPB (mini-bag)  1,000 mg IntraVENous Once Julia Priest MD         Home Medications:  Prior to Admission medications    Not on File     Allergies:  Codeine and Vicodin [hydrocodone-acetaminophen]      Social History:   TOBACCO:   reports that she has quit smoking. Her smoking use included cigarettes. She smoked an average of .5 packs per day. She has never used smokeless tobacco.  ETOH:   reports no history of alcohol use. Family History:   No family history on file. REVIEW OF SYSTEMS:    Patient reports no complaints related to eyes, ears, nose , throat or mouth. No chest pain or SOB. No urinary complaints or musculoskeletal complaints. No skin rashes, bleeding tendencies. Current GI complaints RLQ pain. PHYSICAL EXAM:    VITALS:  AFVSS  Comfortable. No distress. Eyes:  No scleral icterus  Mouth:  Mucus membranes moist  Skin:  No rashes  Chest:  CTA  Heart:  Regular  Abdomen:Soft. Non distended. Tender RLQ.    Extremities:  No edema  Neurologic:  No focal deficits  Psychiatric : AAA O x 3      DATA:  CBC with Differential:    Lab Results   Component Value Date/Time    WBC 22.4 02/13/2023 05:30 PM    RBC 4.36 02/13/2023 05:30 PM    HGB 13.2 02/13/2023 05:30 PM    HCT 38.9 02/13/2023 05:30 PM     02/13/2023 05:30 PM    MCV 89.3 02/13/2023 05:30 PM    MCH 30.2 02/13/2023 05:30 PM    MCHC 33.8 02/13/2023 05:30 PM    RDW 12.9 02/13/2023 05:30 PM    SEGSPCT 68.9 06/13/2012 04:08 AM    LYMPHOPCT 4.0 02/13/2023 05:30 PM    MONOPCT 3.3 02/13/2023 05:30 PM    EOSPCT 1.0 05/04/2012 12:05 PM    BASOPCT 0.5 02/13/2023 05:30 PM    MONOSABS 0.7 02/13/2023 05:30 PM    LYMPHSABS 0.9 02/13/2023 05:30 PM    EOSABS 0.0 02/13/2023 05:30 PM    BASOSABS 0.1 02/13/2023 05:30 PM    DIFFTYPE Auto 06/13/2012 04:08 AM     CMP:    Lab Results   Component Value Date/Time     02/13/2023 05:30 PM    K 4.0 02/13/2023 05:30 PM     02/13/2023 05:30 PM    CO2 24 02/13/2023 05:30 PM    BUN 13 02/13/2023 05:30 PM    CREATININE 0.7 02/13/2023 05:30 PM    GFRAA >60 03/09/2020 09:27 AM    GFRAA >60 06/13/2012 04:08 AM    AGRATIO 1.9 02/13/2023 05:30 PM    LABGLOM >60 02/13/2023 05:30 PM    GLUCOSE 138 02/13/2023 05:30 PM    PROT 7.8 02/13/2023 05:30 PM    PROT 7.4 06/13/2012 04:08 AM    LABALBU 5.1 02/13/2023 05:30 PM    CALCIUM 10.0 02/13/2023 05:30 PM    BILITOT 0.5 02/13/2023 05:30 PM    ALKPHOS 75 02/13/2023 05:30 PM    AST 19 02/13/2023 05:30 PM    ALT 19 02/13/2023 05:30 PM     Radiology Review:  CT with dilated appendix with inflammation. Incidental 1.5 cm left lobe liver mass. ASSESSMENT:   Appendicitis  Left lobe liver mass    Plan:    The diagnosis and recommended procedure were explained. Questions answered. Prepare for appendix surgery. Patient advised to have follow up MRI for liver mass as outpatient.

## 2023-02-14 NOTE — DISCHARGE INSTRUCTIONS
[facility]. Macarena Garza M.D. [address] [facility] [address]                [address] Katheen Halsted, M.D. [address] [facility], [address] [place] [address]         [place] Anais Bay M.D                         [phone]          [facility] Steve Nunez M.D. [facility]                                                                       POST-OPERATIVE INSTRUCTIONS FOR APPENDECTOMY    Call the office to schedule your post-operative appointment with your surgeon for two (2) weeks. You will have either white steri-strips and a water occlusive dressing or surgical glue closing your incisions. If you have clear bandages over your incisions, you may remove them in 2 days. Leave the steri-stips in place. These will peel away in 7-10 days. You may shower after removing your dressing 2 days after surgery. Wash incisions gently, and pat them dry. Do not rub your incisions. General guidelines for activity:  Avoid strenuous activity or lifting anything heavier than 15 pounds. It is OK to be up walking around; walking up and down stairs is also OK. Do what is comfortable: stop and rest when you feel tired. Drink plenty of fluids and stay on a bland diet for 2-3 days after surgery. Do NOT drive while taking your narcotic pain medicine. You can resume driving when you feel capable of responding to urgent situation if needed and not taking prescription pain medication. Watch for signs of infection:   Fever over 100.5°   Excessive warmth or bright redness around your incisions  Leakage of bloody or cloudy fluid from you incisions    During the laparoscopic procedure that you had, gas is pumped into the abdominal cavity. You may feel abdominal, shoulder, or rib pain for a few days due to this. You will have pain medicine ordered. Take as directed. If you experience constipation  Increase your water intake, and activity; walking is best.  A stool softener or mild laxative may be necessary if you still have not had a bowel  movement ; call the office for further instructions.

## 2023-02-14 NOTE — ED PROVIDER NOTES
I independently performed a history and physical on [facility]. All diagnostic, treatment, and disposition decisions were made by myself in conjunction with the advanced practice provider/resident. Labs Reviewed   CBC WITH AUTO DIFFERENTIAL - Abnormal; Notable for the following components:       Result Value    WBC 22.4 (*)     Neutrophils Absolute 20.7 (*)     Lymphocytes Absolute 0.9 (*)     All other components within normal limits   COMPREHENSIVE METABOLIC PANEL - Abnormal; Notable for the following components:    Glucose 138 (*)     Albumin 5.1 (*)     All other components within normal limits   URINALYSIS WITH REFLEX TO CULTURE - Abnormal; Notable for the following components:    Urobilinogen, Urine 2.0 (*)     All other components within normal limits   CULTURE, BLOOD 1   CULTURE, BLOOD 2   HCG, QUANTITATIVE, PREGNANCY   LACTATE, SEPSIS   LIPASE   PROCALCITONIN   LACTATE, SEPSIS     CT ABDOMEN PELVIS W IV CONTRAST Additional Contrast? None   Final Result   Acute uncomplicated appendicitis      Nonspecific 15 mm round low-attenuation lesion in the lateral segment of the   left lobe of the liver. This should be further evaluated with a non emergent   contrast MRI of the liver. XR ABDOMEN (KUB) (SINGLE AP VIEW)   Final Result   No acute abnormality. For further details of [facility] emergency department encounter, please see the BERTO/resident's documentation. I personally saw the patient and performed a substantive portion of the visit including all aspects of the medical decision making. Briefly, this is a 58-year-old female, presenting with concerns for abdominal pain that started around 2:30 PM.  She states that it started in the center of her abdomen and then started to migrate to her right lower quadrant. Labs reveal leukocytosis of 22,000's, CT shows acute uncomplicated appendicitis. Patient was treated with a dose of IV Zosyn.   I consulted surgery and spoke to Dr. Haylie Youngblood who requested I offer discharge to the patient with plans to go to [facility] at 8 AM tomorrow morning for outpatient surgery as she is already received a dose of antibiotics here. I discussed this option with the patient and she would prefer to be discharged home. She was given a dose of IV Toradol prior to discharge and was counseled on the need to be n.p.o. after midnight and need for close surgical follow-up with Dr. Haylie Youngblood in the morning. Given strict return precautions for the ED for any new or worsening symptoms. I personally saw the patient and independently provided 0 minutes of non-concurrent critical care out of the total shared critical care time provided. I, Dr. Enid Echols, am the primary clinician of record. 1. Acute appendicitis with generalized peritonitis, without gangrene or abscess, unspecified whether perforation present      Comment: Please note this report has been produced using speech recognition software and may contain errors related to that system including errors in grammar, punctuation, and spelling, as well as words and phrases that may be inappropriate. If there are any questions or concerns please feel free to contact the dictating provider for clarification.        Gabriel Hyman MD  02/13/23 0566

## 2023-02-14 NOTE — BRIEF OP NOTE
Brief Postoperative Note      Patient: Matilde Mom  YOB: 1990  MRN: [id number]    Date of Procedure: 2/14/2023    Pre-Op Diagnosis: Appendicitis, unspecified appendicitis type [K37]    Post-Op Diagnosis: Same       Procedure:  Laparoscopic appendectomy    Surgeon(s):  Selena Martinez MD    Assistant:  Surgical Assistant: Gregory Cooper    Anesthesia: General    Estimated Blood Loss (mL): Minimal    Complications: None    Specimens:   ID Type Source Tests Collected by Time Destination   A : appendix Tissue Tissue SURGICAL PATHOLOGY Selena Martinez MD 2/14/2023 1854        Implants:  * No implants in log *      Drains: * No LDAs found *    Findings: As above    Electronically signed by Shelby Gupta MD on 2/14/2023 at 9:25 AM

## 2023-02-14 NOTE — PROGRESS NOTES
Pt arrived to PACU from OR in stable condition. Report received from [address] [place] [address] and [facility]. Care of pt transferred at this time. Phase I. Pt immediately placed on bedside monitor. NSR, strip printed. Pt arrived to unit with oral airway in place. SPO2 98% on RA.

## 2023-02-17 LAB
BLOOD CULTURE, ROUTINE: NORMAL
CULTURE, BLOOD 2: NORMAL

## 2023-02-19 NOTE — OP NOTE
[address]                 [address] [address]                                OPERATIVE REPORT    PATIENT NAME: Rebekah Vang                :        1990  MED REC NO:   [id number]                          ROOM:  ACCOUNT NO:   [de-identified]                           ADMIT DATE: 2023  PROVIDER:     Kimberly Huggins MD    DATE OF PROCEDURE:  2023    PREOPERATIVE DIAGNOSIS:  Acute appendicitis. POSTOPERATIVE DIAGNOSIS:  Acute appendicitis. OPERATION PERFORMED:  Laparoscopic appendectomy. SURGEON:  Kimberly Huggins MD    ANESTHESIA:  General.    COMPLICATION:  None. ESTIMATED BLOOD LOSS:  Less than 50 mL. INDICATIONS FOR OPERATION:  A 26-year-old female who presented with  lower abdominal pain. She was tender in the right lower quadrant. She  had CT imaging consistent with appendicitis. The risks and benefits of operative intervention were explained. The  patient understood them, accepted them, and elected to proceed. DESCRIPTION OF OPERATION:  The patient was brought to the operating  room. General anesthesia was induced. She was prepped and draped in  usual surgical sterile fashion. Vertical supraumbilical incision was  made. Veress needle was inserted. Pneumoperitoneum was established. Disposable 5 mm trocar was passed through the incision. Laparoscope was  inserted. Periumbilical incisions were made and 5-mm and 12-mm trocars  were placed. We had visualization of an inflamed but nonperforated appendix in the  right lower quadrant. This was consistent with acute appendicitis and  infection in the abdomen. A window was created in the mesoappendix. Endo-VASILE stapler was passed through this window and the appendix was  amputated at its base at the level of the cecum. PDS Endoloop was used  to control the mesoappendix. Mesoappendix was divided. Specimen was  brought out in Endob[facility].   I irrigated the pelvis, right lower quadrant up  over the liver. I suctioned out the irrigant. There was no evident  bleeding or complication. I deflated the abdomen and removed the  trocars. The fascia at the 12 mm port site was reapproximated with 0  Vicryl suture. Local anesthetic was infiltrated. 4-0 Vicryl was used  to reapproximate the skin at all the incisions. Benzoin, Steri-Strip  dressing was placed. DISPOSITION:  The patient tolerated the procedure without any acute  complication.         Shirley Wallace MD    D: 02/19/2023 11:40:55       T: 02/19/2023 11:44:30     [id number]  Job#: [id number]     Doc#: [id number]    CC:

## 2023-03-01 ENCOUNTER — OFFICE VISIT (OUTPATIENT)
Dept: SURGERY | Age: 33
End: 2023-03-01

## 2023-03-01 VITALS
HEIGHT: 58 IN | BODY MASS INDEX: 25.67 KG/M2 | DIASTOLIC BLOOD PRESSURE: 87 MMHG | WEIGHT: 122.3 LBS | HEART RATE: 86 BPM | SYSTOLIC BLOOD PRESSURE: 111 MMHG

## 2023-03-01 DIAGNOSIS — R16.0 LIVER MASS: Primary | ICD-10-CM

## 2023-03-01 DIAGNOSIS — Z09 SURGICAL FOLLOW-UP CARE: ICD-10-CM

## 2023-03-01 PROCEDURE — 99024 POSTOP FOLLOW-UP VISIT: CPT | Performed by: SURGERY

## 2023-03-14 ENCOUNTER — HOSPITAL ENCOUNTER (OUTPATIENT)
Dept: MRI IMAGING | Age: 33
Discharge: HOME | End: 2023-03-14
Payer: COMMERCIAL

## 2023-03-14 DIAGNOSIS — R16.0 LIVER MASS: ICD-10-CM

## 2023-03-14 PROCEDURE — 6360000004 HC RX CONTRAST MEDICATION: Performed by: SURGERY

## 2023-03-14 PROCEDURE — A9579 GAD-BASE MR CONTRAST NOS,1ML: HCPCS | Performed by: SURGERY

## 2023-03-14 PROCEDURE — 74183 MRI ABD W/O CNTR FLWD CNTR: CPT

## 2023-03-14 RX ADMIN — GADOTERIDOL 5.67 MMOL: 279.3 INJECTION, SOLUTION INTRAVENOUS at 14:37

## 2023-03-17 ENCOUNTER — TELEPHONE (OUTPATIENT)
Dept: SURGERY | Age: 33
End: 2023-03-17

## 2023-03-31 PROBLEM — Z09 SURGICAL FOLLOW-UP CARE: Status: ACTIVE | Noted: 2023-03-31

## 2023-03-31 NOTE — PROGRESS NOTES
[facility] GENERAL SURGERY      S:   Patient presents s/p lap appy 2 weeks  ago. She reports improvement. O:   Comfortable         Incision sites healing well. FINAL DIAGNOSIS:     Appendix, appendectomy:   - Acute appendicitis with serositis. BUCCA/BUCCA               A:   S/P above. Liver mass. P:   Follow up as needed. Will get MRI as recommended to further evaluate incidental liver mass seen on CT.

## 2023-04-30 PROBLEM — Z09 SURGICAL FOLLOW-UP CARE: Status: RESOLVED | Noted: 2023-03-31 | Resolved: 2023-04-30

## 2024-05-20 ENCOUNTER — HOSPITAL ENCOUNTER (EMERGENCY)
Age: 34
Discharge: HOME | End: 2024-05-20
Payer: COMMERCIAL

## 2024-05-20 ENCOUNTER — APPOINTMENT (OUTPATIENT)
Dept: GENERAL RADIOLOGY | Age: 34
End: 2024-05-20
Payer: COMMERCIAL

## 2024-05-20 VITALS
HEART RATE: 67 BPM | WEIGHT: 140 LBS | DIASTOLIC BLOOD PRESSURE: 90 MMHG | RESPIRATION RATE: 15 BRPM | HEIGHT: 58 IN | OXYGEN SATURATION: 98 % | SYSTOLIC BLOOD PRESSURE: 128 MMHG | TEMPERATURE: 97.5 F | BODY MASS INDEX: 29.39 KG/M2

## 2024-05-20 DIAGNOSIS — R42 LIGHTHEADEDNESS: ICD-10-CM

## 2024-05-20 DIAGNOSIS — R11.0 NAUSEA: Primary | ICD-10-CM

## 2024-05-20 LAB
ALBUMIN SERPL-MCNC: 4.2 G/DL (ref 3.4–5)
ALBUMIN/GLOB SERPL: 1.5 {RATIO} (ref 1.1–2.2)
ALP SERPL-CCNC: 67 U/L (ref 40–129)
ALT SERPL-CCNC: 17 U/L (ref 10–40)
ANION GAP SERPL CALCULATED.3IONS-SCNC: 13 MMOL/L (ref 3–16)
AST SERPL-CCNC: 14 U/L (ref 15–37)
BASOPHILS # BLD: 0 K/UL (ref 0–0.2)
BASOPHILS NFR BLD: 0.2 %
BILIRUB SERPL-MCNC: 0.3 MG/DL (ref 0–1)
BILIRUB UR QL STRIP.AUTO: NEGATIVE
BUN SERPL-MCNC: 10 MG/DL (ref 7–20)
CALCIUM SERPL-MCNC: 9.1 MG/DL (ref 8.3–10.6)
CHLORIDE SERPL-SCNC: 102 MMOL/L (ref 99–110)
CLARITY UR: CLEAR
CO2 SERPL-SCNC: 23 MMOL/L (ref 21–32)
COLOR UR: YELLOW
CREAT SERPL-MCNC: <0.5 MG/DL (ref 0.6–1.1)
DEPRECATED RDW RBC AUTO: 12.5 % (ref 12.4–15.4)
EKG ATRIAL RATE: 64 BPM
EKG DIAGNOSIS: NORMAL
EKG P AXIS: 44 DEGREES
EKG P-R INTERVAL: 190 MS
EKG Q-T INTERVAL: 378 MS
EKG QRS DURATION: 74 MS
EKG QTC CALCULATION (BAZETT): 389 MS
EKG R AXIS: 6 DEGREES
EKG T AXIS: 3 DEGREES
EKG VENTRICULAR RATE: 64 BPM
EOSINOPHIL # BLD: 0.4 K/UL (ref 0–0.6)
EOSINOPHIL NFR BLD: 3 %
GFR SERPLBLD CREATININE-BSD FMLA CKD-EPI: >90 ML/MIN/{1.73_M2}
GLUCOSE BLD-MCNC: 106 MG/DL (ref 70–99)
GLUCOSE SERPL-MCNC: 103 MG/DL (ref 70–99)
GLUCOSE UR STRIP.AUTO-MCNC: NEGATIVE MG/DL
HCT VFR BLD AUTO: 35.7 % (ref 36–48)
HGB BLD-MCNC: 11.9 G/DL (ref 12–16)
HGB UR QL STRIP.AUTO: NEGATIVE
KETONES UR STRIP.AUTO-MCNC: NEGATIVE MG/DL
LACTATE BLDV-SCNC: 1 MMOL/L (ref 0.4–2)
LACTATE BLDV-SCNC: 2.1 MMOL/L (ref 0.4–1.9)
LEUKOCYTE ESTERASE UR QL STRIP.AUTO: NEGATIVE
LYMPHOCYTES # BLD: 1.3 K/UL (ref 1–5.1)
LYMPHOCYTES NFR BLD: 10.8 %
MCH RBC QN AUTO: 29.4 PG (ref 26–34)
MCHC RBC AUTO-ENTMCNC: 33.3 G/DL (ref 31–36)
MCV RBC AUTO: 88.5 FL (ref 80–100)
MONOCYTES # BLD: 0.5 K/UL (ref 0–1.3)
MONOCYTES NFR BLD: 4.4 %
NEUTROPHILS # BLD: 9.6 K/UL (ref 1.7–7.7)
NEUTROPHILS NFR BLD: 81.6 %
NITRITE UR QL STRIP.AUTO: NEGATIVE
PERFORMED ON: ABNORMAL
PH UR STRIP.AUTO: 6.5 [PH] (ref 5–8)
PLATELET # BLD AUTO: 298 K/UL (ref 135–450)
PMV BLD AUTO: 8 FL (ref 5–10.5)
POTASSIUM SERPL-SCNC: 4 MMOL/L (ref 3.5–5.1)
PROT SERPL-MCNC: 7 G/DL (ref 6.4–8.2)
PROT UR STRIP.AUTO-MCNC: NEGATIVE MG/DL
RBC # BLD AUTO: 4.03 M/UL (ref 4–5.2)
SODIUM SERPL-SCNC: 138 MMOL/L (ref 136–145)
SP GR UR STRIP.AUTO: 1.01 (ref 1–1.03)
TROPONIN, HIGH SENSITIVITY: <6 NG/L (ref 0–14)
UA COMPLETE W REFLEX CULTURE PNL UR: NORMAL
UA DIPSTICK W REFLEX MICRO PNL UR: NORMAL
URN SPEC COLLECT METH UR: NORMAL
UROBILINOGEN UR STRIP-ACNC: 0.2 E.U./DL
WBC # BLD AUTO: 11.8 K/UL (ref 4–11)

## 2024-05-20 PROCEDURE — 71045 X-RAY EXAM CHEST 1 VIEW: CPT

## 2024-05-20 PROCEDURE — 99285 EMERGENCY DEPT VISIT HI MDM: CPT

## 2024-05-20 PROCEDURE — 85025 COMPLETE CBC W/AUTO DIFF WBC: CPT

## 2024-05-20 PROCEDURE — 2580000003 HC RX 258

## 2024-05-20 PROCEDURE — 80053 COMPREHEN METABOLIC PANEL: CPT

## 2024-05-20 PROCEDURE — 84484 ASSAY OF TROPONIN QUANT: CPT

## 2024-05-20 PROCEDURE — 83605 ASSAY OF LACTIC ACID: CPT

## 2024-05-20 PROCEDURE — 93005 ELECTROCARDIOGRAM TRACING: CPT

## 2024-05-20 PROCEDURE — 81003 URINALYSIS AUTO W/O SCOPE: CPT

## 2024-05-20 PROCEDURE — 87040 BLOOD CULTURE FOR BACTERIA: CPT

## 2024-05-20 PROCEDURE — 93010 ELECTROCARDIOGRAM REPORT: CPT | Performed by: INTERNAL MEDICINE

## 2024-05-20 RX ORDER — ONDANSETRON 2 MG/ML
4 INJECTION INTRAMUSCULAR; INTRAVENOUS ONCE
Status: DISCONTINUED | OUTPATIENT
Start: 2024-05-20 | End: 2024-05-20 | Stop reason: HOSPADM

## 2024-05-20 RX ORDER — 0.9 % SODIUM CHLORIDE 0.9 %
1000 INTRAVENOUS SOLUTION INTRAVENOUS ONCE
Status: COMPLETED | OUTPATIENT
Start: 2024-05-20 | End: 2024-05-20

## 2024-05-20 RX ORDER — ONDANSETRON 4 MG/1
4 TABLET, FILM COATED ORAL 3 TIMES DAILY PRN
Qty: 15 TABLET | Refills: 0 | Status: SHIPPED | OUTPATIENT
Start: 2024-05-20

## 2024-05-20 RX ADMIN — SODIUM CHLORIDE 1000 ML: 9 INJECTION, SOLUTION INTRAVENOUS at 08:59

## 2024-05-20 ASSESSMENT — PAIN SCALES - GENERAL: PAINLEVEL_OUTOF10: 0

## 2024-05-20 ASSESSMENT — PAIN - FUNCTIONAL ASSESSMENT: PAIN_FUNCTIONAL_ASSESSMENT: 0-10

## 2024-05-20 NOTE — ED PROVIDER NOTES
[facility]  ED  Emergency Department Encounter    Patient Name: Ruthie White  MRN: [id number]  YOB: 1990  Date of Evaluation: 5/20/2024  Provider: None, None  Note Started: 8:24 AM EDT 5/20/24    CHIEF COMPLAINT  Dizziness and Nausea (Hx of Hysterectomy on 5/16. Woke up this morning dizzy and shaking and nausea)    SHARED SERVICE VISIT  Evaluated by BERTO.  My supervising physician was available for consultation. Orienting with Jameel Quiroz.     HISTORY OF PRESENT ILLNESS  Ruthie White is a 33 y.o. female who presents to the ED for evaluation of lightheadedness onset this morning.  Patient states that she had a hysterectomy 4 days ago and seem to be doing well postoperatively over the past several days however states that she woke up this morning and was feeling chills, shaking, nausea, and lightheadedness.  Patient denies syncope however states that she felt like she could not get up because she could pass out.  Patient states that soon after this episode she had a bowel movement of diarrhea and soiled herself because she could not hold it.  Patient denies fever, headache, cough, shortness of breath, chest pain, abdominal pain, vomiting, dysuria, hematuria, vaginal bleeding, vaginal discharge, neck pain, back pain, and rash.    No other complaints, modifying factors or associated symptoms.     Nursing notes reviewed were all reviewed and agreed with or any disagreements were addressed in the HPI.    PMH:  Past Medical History:   Diagnosis Date    IBS (irritable bowel syndrome)     Small bowel problem      Surgical History:  Past Surgical History:   Procedure Laterality Date    CHOLECYSTECTOMY      HYSTERECTOMY (CERVIX STATUS UNKNOWN)      LAPAROSCOPIC APPENDECTOMY  02/14/2023    LAPAROSCOPIC APPENDECTOMY    LAPAROSCOPIC APPENDECTOMY N/A 02/14/2023    LAPAROSCOPIC APPENDECTOMY, performed by Zion Bundy MD at [facility] OR     Family History:  History reviewed. No

## 2024-05-20 NOTE — ED PROVIDER NOTES
Independently reviewed ECG completed for Ruthie White. I did not see this patient and was not involved in their care.     ECG  The Ekg interpreted by me shows  sinus arrhythmia with a rate of 64  Axis is   Normal  QTc is  normal  Intervals and Durations are unremarkable.      ST Segments: nonspecific changes  No significant change from prior EKG dated 3/9/20        Maude Donaldson MD  05/21/24 2201

## 2024-05-24 LAB
BACTERIA BLD CULT ORG #2: NORMAL
BACTERIA BLD CULT: NORMAL

## 2025-02-22 ENCOUNTER — APPOINTMENT (OUTPATIENT)
Dept: GENERAL RADIOLOGY | Age: 35
End: 2025-02-22

## 2025-02-22 ENCOUNTER — HOSPITAL ENCOUNTER (EMERGENCY)
Age: 35
Discharge: HOME | End: 2025-02-22

## 2025-02-22 VITALS
OXYGEN SATURATION: 99 % | HEART RATE: 73 BPM | RESPIRATION RATE: 18 BRPM | DIASTOLIC BLOOD PRESSURE: 92 MMHG | SYSTOLIC BLOOD PRESSURE: 146 MMHG | TEMPERATURE: 97.8 F

## 2025-02-22 DIAGNOSIS — R07.9 CHEST PAIN, UNSPECIFIED TYPE: Primary | ICD-10-CM

## 2025-02-22 LAB
ALBUMIN SERPL-MCNC: 4.8 G/DL (ref 3.4–5)
ALBUMIN/GLOB SERPL: 1.5 {RATIO} (ref 1.1–2.2)
ALP SERPL-CCNC: 87 U/L (ref 40–129)
ALT SERPL-CCNC: 34 U/L (ref 10–40)
ANION GAP SERPL CALCULATED.3IONS-SCNC: 13 MMOL/L (ref 3–16)
AST SERPL-CCNC: 22 U/L (ref 15–37)
BASOPHILS # BLD: 0.1 K/UL (ref 0–0.2)
BASOPHILS NFR BLD: 0.8 %
BILIRUB SERPL-MCNC: <0.2 MG/DL (ref 0–1)
BUN SERPL-MCNC: 15 MG/DL (ref 7–20)
CALCIUM SERPL-MCNC: 9.9 MG/DL (ref 8.3–10.6)
CHLORIDE SERPL-SCNC: 102 MMOL/L (ref 99–110)
CO2 SERPL-SCNC: 27 MMOL/L (ref 21–32)
CREAT SERPL-MCNC: 0.7 MG/DL (ref 0.6–1.1)
D-DIMER QUANTITATIVE: 0.4 UG/ML FEU (ref 0–0.6)
DEPRECATED RDW RBC AUTO: 12.9 % (ref 12.4–15.4)
EOSINOPHIL # BLD: 0.1 K/UL (ref 0–0.6)
EOSINOPHIL NFR BLD: 1 %
FLUAV RNA RESP QL NAA+PROBE: NOT DETECTED
FLUBV RNA RESP QL NAA+PROBE: NOT DETECTED
GFR SERPLBLD CREATININE-BSD FMLA CKD-EPI: >90 ML/MIN/{1.73_M2}
GLUCOSE SERPL-MCNC: 93 MG/DL (ref 70–99)
HCT VFR BLD AUTO: 38.2 % (ref 36–48)
HGB BLD-MCNC: 12.8 G/DL (ref 12–16)
LYMPHOCYTES # BLD: 3.8 K/UL (ref 1–5.1)
LYMPHOCYTES NFR BLD: 33.2 %
MCH RBC QN AUTO: 29.7 PG (ref 26–34)
MCHC RBC AUTO-ENTMCNC: 33.7 G/DL (ref 31–36)
MCV RBC AUTO: 88.2 FL (ref 80–100)
MONOCYTES # BLD: 0.4 K/UL (ref 0–1.3)
MONOCYTES NFR BLD: 3.4 %
NEUTROPHILS # BLD: 7 K/UL (ref 1.7–7.7)
NEUTROPHILS NFR BLD: 61.6 %
PLATELET # BLD AUTO: 366 K/UL (ref 135–450)
PMV BLD AUTO: 7.7 FL (ref 5–10.5)
POTASSIUM SERPL-SCNC: 4.1 MMOL/L (ref 3.5–5.1)
PROT SERPL-MCNC: 7.9 G/DL (ref 6.4–8.2)
RBC # BLD AUTO: 4.33 M/UL (ref 4–5.2)
SARS-COV-2 RNA RESP QL NAA+PROBE: NOT DETECTED
SODIUM SERPL-SCNC: 142 MMOL/L (ref 136–145)
TROPONIN, HIGH SENSITIVITY: <6 NG/L (ref 0–14)
WBC # BLD AUTO: 11.3 K/UL (ref 4–11)

## 2025-02-22 PROCEDURE — 71045 X-RAY EXAM CHEST 1 VIEW: CPT

## 2025-02-22 PROCEDURE — 87636 SARSCOV2 & INF A&B AMP PRB: CPT

## 2025-02-22 PROCEDURE — 85379 FIBRIN DEGRADATION QUANT: CPT

## 2025-02-22 PROCEDURE — 36415 COLL VENOUS BLD VENIPUNCTURE: CPT

## 2025-02-22 PROCEDURE — 85025 COMPLETE CBC W/AUTO DIFF WBC: CPT

## 2025-02-22 PROCEDURE — 93005 ELECTROCARDIOGRAM TRACING: CPT | Performed by: EMERGENCY MEDICINE

## 2025-02-22 PROCEDURE — 99285 EMERGENCY DEPT VISIT HI MDM: CPT

## 2025-02-22 PROCEDURE — 84484 ASSAY OF TROPONIN QUANT: CPT

## 2025-02-22 PROCEDURE — 80053 COMPREHEN METABOLIC PANEL: CPT

## 2025-02-22 ASSESSMENT — HEART SCORE: ECG: NORMAL

## 2025-02-23 LAB
EKG ATRIAL RATE: 67 BPM
EKG DIAGNOSIS: NORMAL
EKG P AXIS: 46 DEGREES
EKG P-R INTERVAL: 190 MS
EKG Q-T INTERVAL: 350 MS
EKG QRS DURATION: 76 MS
EKG QTC CALCULATION (BAZETT): 369 MS
EKG R AXIS: -2 DEGREES
EKG T AXIS: 46 DEGREES
EKG VENTRICULAR RATE: 67 BPM

## 2025-02-23 PROCEDURE — 93010 ELECTROCARDIOGRAM REPORT: CPT | Performed by: INTERNAL MEDICINE

## 2025-02-23 NOTE — ED PROVIDER NOTES
I did not personally evaluate this patient but I was asked to review the EKG.     EKG  The Ekg interpreted by myself in the emergency department in the absence of a cardiologist.  normal sinus rhythm with a rate of 67  Axis is   Normal  QTc is  within an acceptable range  Intervals and Durations are unremarkable.      No specific ST-T wave changes appreciated.  No evidence of acute ischemia.   No significant change from prior EKG dated 5/20/24     Igor Cleaning MD  02/22/25 2032    
Normal rate and regular rhythm.      Pulses: Normal pulses.      Heart sounds: Normal heart sounds. No murmur heard.     No friction rub. No gallop.   Pulmonary:      Effort: Pulmonary effort is normal. No respiratory distress.      Breath sounds: Normal breath sounds. No stridor. No wheezing, rhonchi or rales.   Abdominal:      General: Abdomen is flat.      Palpations: Abdomen is soft.      Tenderness: There is no abdominal tenderness. There is no guarding or rebound.   Musculoskeletal:         General: Normal range of motion.      Cervical back: Normal range of motion and neck supple.      Right lower leg: No edema.      Left lower leg: No edema.   Skin:     General: Skin is warm and dry.      Coloration: Skin is not pale.   Neurological:      Mental Status: She is alert and oriented to person, place, and time.   Psychiatric:         Mood and Affect: Mood normal.         Behavior: Behavior normal.             DIAGNOSTIC RESULTS   LABS:    Labs Reviewed   CBC WITH AUTO DIFFERENTIAL - Abnormal; Notable for the following components:       Result Value    WBC 11.3 (*)     All other components within normal limits   COVID-19 & INFLUENZA COMBO   COMPREHENSIVE METABOLIC PANEL W/ REFLEX TO MG FOR LOW K   TROPONIN   D-DIMER, QUANTITATIVE       When ordered only abnormal lab results are displayed. All other labs were within normal range or not returned as of this dictation.    EKG: When ordered, EKG's are interpreted by the Emergency Department Physician in the absence of a cardiologist.  Please see their note for interpretation of EKG.    RADIOLOGY:   Non-plain film images such as CT, Ultrasound and MRI are read by the radiologist. Plain radiographic images are visualized and preliminarily interpreted by the ED Provider with the below findings:    Chest x-ray is unremarkable    Interpretation per the Radiologist below, if available at the time of this note:    XR CHEST PORTABLE   Final Result   1. No acute disease.

## (undated) DEVICE — TROCAR: Brand: KII® SLEEVE

## (undated) DEVICE — APPLICATOR MEDICATED 26 CC SOLUTION HI LT ORNG CHLORAPREP

## (undated) DEVICE — CORD ES L10FT MPLR LAP

## (undated) DEVICE — INSUFFLATION NEEDLE TO ESTABLISH PNEUMOPERITONEUM.: Brand: INSUFFLATION NEEDLE

## (undated) DEVICE — KIT,ANTI FOG,W/SPONGE & FLUID,SOFT PACK: Brand: MEDLINE

## (undated) DEVICE — SYRINGE, LUER LOCK, 10ML: Brand: MEDLINE

## (undated) DEVICE — INTENDED FOR TISSUE SEPARATION, AND OTHER PROCEDURES THAT REQUIRE A SHARP SURGICAL BLADE TO PUNCTURE OR CUT.: Brand: BARD-PARKER ® DISPOSABLE SCALPELS

## (undated) DEVICE — CUTTER ENDOSCP L340MM LIN ARTC SGL STROKE FIRING ENDOPATH

## (undated) DEVICE — LOOP LIG SUT SZ 0 L18IN ABSRB POLYDIOXANONE MFIL PDS II

## (undated) DEVICE — TROCAR: Brand: KII FIOS FIRST ENTRY

## (undated) DEVICE — SEALER TISS L37CM SHFT DIA5MM 360DEG ROT CVD JAW TAPR TIP

## (undated) DEVICE — SUTURE VCRL SZ 4-0 L18IN ABSRB UD L19MM PS-2 3/8 CIR PRIM J496H

## (undated) DEVICE — 3M™ TEGADERM™ TRANSPARENT FILM DRESSING FRAME STYLE, 1624W, 2-3/8 IN X 2-3/4 IN (6 CM X 7 CM), 100/CT 4CT/CASE: Brand: 3M™ TEGADERM™

## (undated) DEVICE — STANDARD HYPODERMIC NEEDLE,POLYPROPYLENE HUB: Brand: MONOJECT

## (undated) DEVICE — PUMP SUC IRR TBNG L10FT W/ HNDPC ASSEMB STRYKEFLOW 2

## (undated) DEVICE — GLOVE ORANGE PI 8 1/2   MSG9085

## (undated) DEVICE — SUTURE SZ 0 27IN 5/8 CIR UR-6  TAPER PT VIOLET ABSRB VICRYL J603H

## (undated) DEVICE — DRAPE,ABDOMINAL,MAJOR,STERILE: Brand: MEDLINE

## (undated) DEVICE — 3M™ STERI-STRIP™ REINFORCED ADHESIVE SKIN CLOSURES, R1540, 1/8 IN X 3 IN (3 MM X 75 MM), 5 STRIPS/ENVELOPE: Brand: 3M™ STERI-STRIP™

## (undated) DEVICE — SOLUTION IV IRRIG 500ML 0.9% SODIUM CHL 2F7123

## (undated) DEVICE — GAUZE SPONGES,8 PLY: Brand: CURITY

## (undated) DEVICE — INSUFFLATION TUBING,LAPAROSCOPIC: Brand: DEROYAL

## (undated) DEVICE — 3M™ STERI-STRIP™ COMPOUND BENZOIN TINCTURE 40 BAGS/CARTON 4 CARTONS/CASE C1544: Brand: 3M™ STERI-STRIP™

## (undated) DEVICE — RELOAD STPL H1-2.5X45MM VASC THN TISS WHT 6 ROW B FRM SGL

## (undated) DEVICE — ELECTRODE PT RET AD L9FT HI MOIST COND ADH HYDRGEL CORDED

## (undated) DEVICE — MAJOR SET UP PK

## (undated) DEVICE — CIRCUIT ANES L72IN 3L BACT AND VIR FLTR EL CONN SGL LIMB

## (undated) DEVICE — TISSUE RETRIEVAL SYSTEM: Brand: INZII RETRIEVAL SYSTEM

## (undated) DEVICE — GOWN,AURORA,NONREINF,RAGLAN,XXL,STERILE: Brand: MEDLINE